# Patient Record
Sex: MALE | Race: BLACK OR AFRICAN AMERICAN | NOT HISPANIC OR LATINO | URBAN - METROPOLITAN AREA
[De-identification: names, ages, dates, MRNs, and addresses within clinical notes are randomized per-mention and may not be internally consistent; named-entity substitution may affect disease eponyms.]

---

## 2017-01-21 ENCOUNTER — EMERGENCY (EMERGENCY)
Facility: HOSPITAL | Age: 67
LOS: 1 days | Discharge: HOME | End: 2017-01-21
Attending: EMERGENCY MEDICINE | Admitting: EMERGENCY MEDICINE
Payer: MEDICARE

## 2017-01-21 VITALS
DIASTOLIC BLOOD PRESSURE: 92 MMHG | RESPIRATION RATE: 16 BRPM | OXYGEN SATURATION: 100 % | HEART RATE: 70 BPM | TEMPERATURE: 98 F | SYSTOLIC BLOOD PRESSURE: 163 MMHG

## 2017-01-21 DIAGNOSIS — R22.0 LOCALIZED SWELLING, MASS AND LUMP, HEAD: ICD-10-CM

## 2017-01-21 PROCEDURE — 99283 EMERGENCY DEPT VISIT LOW MDM: CPT | Mod: GC

## 2017-01-21 PROCEDURE — 99283 EMERGENCY DEPT VISIT LOW MDM: CPT

## 2017-01-21 NOTE — ED PROVIDER NOTE - PHYSICAL EXAMINATION
Gen: NAD, AOx3  Head: NCAT, nontender to palpation  HEENT: PERRL, oral mucosa moist, normal conjunctiva  Lung: CTAB, no respiratory distress  CV: rrr, no murmurs, Normal perfusion  MSK: No edema, no visible deformities  Neuro: No focal neurologic deficits  Skin: No rash   Psych: normal affect   - Neelam Méndez, DO

## 2017-01-21 NOTE — ED PROVIDER NOTE - MEDICAL DECISION MAKING DETAILS
atraumatic "bump" on head.  No fluctuance, non tender, otherwise well.  Discharge home with PMD follow up. atraumatic "bump" on head.  No fluctuance, non tender, otherwise well.  Discharge home with PMD follow up.  Attending Statement: Agree with the above.  Mass c/w lipoma.  Noted to have it years ago but smaller.  No s/s of infection.  No fluctuance.  Provided reassurance.  D/C.  --GUEVARA

## 2017-01-21 NOTE — ED ADULT NURSE NOTE - OBJECTIVE STATEMENT
67 y/o M pt w/ no pmh, present to ED for bump to back of head, pt was painting ceiling, while he was coming down off th ladder, pt wife notice small bump to his head, pt was unaware it was there, denies pain, injury, fever, chills, dizziness, tenderness

## 2017-01-21 NOTE — ED PROVIDER NOTE - OBJECTIVE STATEMENT
66 M no pmh presents with "bump on back of head."  Pt's wife noticed a "bump" on the back of pts head after he has been painting the ceiling.  No trauma to area.  Pt denies pain, tenderness, redness.  No fever/chills.  Pt does not takee any medication.  No other complaints at this time.    - Neelam Méndez,

## 2017-01-21 NOTE — ED PROVIDER NOTE - CARE PLAN
Principal Discharge DX:	Head lump  Instructions for follow-up, activity and diet:	Take Motrin 400-600mg every 6 hrs as needed for pain. Take with food  Follow up with PMD if any changes in symptoms.

## 2018-03-07 PROBLEM — Z00.00 ENCOUNTER FOR PREVENTIVE HEALTH EXAMINATION: Status: ACTIVE | Noted: 2018-03-07

## 2018-03-29 ENCOUNTER — APPOINTMENT (OUTPATIENT)
Dept: UROLOGY | Facility: CLINIC | Age: 68
End: 2018-03-29
Payer: MEDICARE

## 2018-03-29 DIAGNOSIS — R97.20 ELEVATED PROSTATE, SPECIFIC ANTIGEN [PSA]: ICD-10-CM

## 2018-03-29 DIAGNOSIS — Z87.891 PERSONAL HISTORY OF NICOTINE DEPENDENCE: ICD-10-CM

## 2018-03-29 PROCEDURE — 99204 OFFICE O/P NEW MOD 45 MIN: CPT

## 2018-03-30 RX ORDER — VITAMIN E ACID SUCCINATE 268 MG
TABLET ORAL
Refills: 0 | Status: ACTIVE | COMMUNITY

## 2018-03-30 RX ORDER — CHROMIUM 200 MCG
TABLET ORAL
Refills: 0 | Status: ACTIVE | COMMUNITY

## 2018-03-30 RX ORDER — MULTIVIT-MIN/IRON/FOLIC ACID/K 18-600-40
CAPSULE ORAL
Refills: 0 | Status: ACTIVE | COMMUNITY

## 2018-04-05 LAB
PSA FREE FLD-MCNC: 4.9
PSA FREE SERPL-MCNC: 0.42 NG/ML
PSA SERPL-MCNC: 8.58 NG/ML

## 2020-10-04 ENCOUNTER — EMERGENCY (EMERGENCY)
Facility: HOSPITAL | Age: 70
LOS: 1 days | Discharge: HOME | End: 2020-10-04
Attending: STUDENT IN AN ORGANIZED HEALTH CARE EDUCATION/TRAINING PROGRAM
Payer: COMMERCIAL

## 2020-10-04 VITALS
TEMPERATURE: 98 F | WEIGHT: 164.91 LBS | DIASTOLIC BLOOD PRESSURE: 80 MMHG | HEIGHT: 68 IN | HEART RATE: 78 BPM | OXYGEN SATURATION: 100 % | RESPIRATION RATE: 18 BRPM | SYSTOLIC BLOOD PRESSURE: 181 MMHG

## 2020-10-04 VITALS
DIASTOLIC BLOOD PRESSURE: 100 MMHG | SYSTOLIC BLOOD PRESSURE: 159 MMHG | HEART RATE: 69 BPM | RESPIRATION RATE: 18 BRPM | OXYGEN SATURATION: 100 %

## 2020-10-04 PROCEDURE — 70450 CT HEAD/BRAIN W/O DYE: CPT | Mod: 26

## 2020-10-04 PROCEDURE — 70450 CT HEAD/BRAIN W/O DYE: CPT

## 2020-10-04 PROCEDURE — 72125 CT NECK SPINE W/O DYE: CPT

## 2020-10-04 PROCEDURE — 99285 EMERGENCY DEPT VISIT HI MDM: CPT

## 2020-10-04 PROCEDURE — 99284 EMERGENCY DEPT VISIT MOD MDM: CPT

## 2020-10-04 PROCEDURE — 71045 X-RAY EXAM CHEST 1 VIEW: CPT | Mod: 26

## 2020-10-04 PROCEDURE — 72125 CT NECK SPINE W/O DYE: CPT | Mod: 26

## 2020-10-04 PROCEDURE — 71045 X-RAY EXAM CHEST 1 VIEW: CPT

## 2020-10-04 RX ORDER — DIAZEPAM 5 MG
5 TABLET ORAL ONCE
Refills: 0 | Status: DISCONTINUED | OUTPATIENT
Start: 2020-10-04 | End: 2020-10-04

## 2020-10-04 RX ORDER — DIAZEPAM 5 MG
1 TABLET ORAL
Qty: 9 | Refills: 0
Start: 2020-10-04 | End: 2020-10-06

## 2020-10-04 RX ORDER — IBUPROFEN 200 MG
600 TABLET ORAL ONCE
Refills: 0 | Status: COMPLETED | OUTPATIENT
Start: 2020-10-04 | End: 2020-10-04

## 2020-10-04 RX ADMIN — Medication 5 MILLIGRAM(S): at 15:30

## 2020-10-04 RX ADMIN — Medication 600 MILLIGRAM(S): at 15:30

## 2020-10-04 NOTE — ED PROVIDER NOTE - PROGRESS NOTE DETAILS
Ashley Rios MD pt signed out to me pending results of ct scan, he has pain in the anterior shoulder, on the R side w/ intact ROM Of shoulder joint passively, active ROM limited to 90 degrees of abduction due to pain in the shoulder. PT w/ no palpable deformity of the humerus, has 5/5 strength in the upper extremity w/ elbow/wrist flexion and extension and finger Abduction; intact sensation along the c5-8 dermatone. will provide sling for comfort and dc w/ outpt orthopedic follow up Patient put into sling for right shoulder pain. states pain has improved. will follow up with orthopedics if sxs do not improve.

## 2020-10-04 NOTE — ED PROVIDER NOTE - NSFOLLOWUPCLINICS_GEN_ALL_ED_FT
[facility]  Orthopedic Surgery  [address], [address] & [address] [place] [address]  Phone: [phone]  Fax:   Follow Up Time: 7-10 Days    [facility]  Sports Medicine  [address] [place] [id number]  Phone: [phone]  Fax:   Follow Up Time: 7-10 Days

## 2020-10-04 NOTE — ED ADULT NURSE REASSESSMENT NOTE - NS ED NURSE REASSESS COMMENT FT1
pt states he still has pain on his right side but the pain has subsided a little bit with the medications.
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
## 2020-10-04 NOTE — ED PROVIDER NOTE - PHYSICAL EXAMINATION
Gen: NAD, non-toxic, conversational  Eyes: PERRLA, EOMI   HENT: Normocephalic, atraumatic. Ttp left neck mild, right neck with moderate ttp alone the paraspinal region, able to reproduce his pain with rotation of the neck left/right, no step off or deformity. External ears normal, no rhinorrhea, moist mucous membranes.   CV: RRR, no M/R/G  Resp: CTAB, non-labored, speaking without difficulty on room air  Abd: soft, non tender, non rigid, no guarding or rebound tenderness  Back: No CVAT bilaterally, no midline ttp  Skin: dry, wwp   Neuro: AOx3, speech is fluent and appropriate, CN 2-12 intact, motor 5/5 & symmetric in BUE/BLE, sensation grossly intact  Psych: Mood okay, affect euthymic

## 2020-10-04 NOTE — ED PROVIDER NOTE - PATIENT PORTAL LINK FT
You can access the FollowMyHealth Patient Portal offered by [facility] by registering at the following website: [email or web address]. By joining [facility] FollowMyHealth portal, you will also be able to view your health information using other applications (apps) compatible with our system.
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
